# Patient Record
Sex: MALE | Race: WHITE | URBAN - METROPOLITAN AREA
[De-identification: names, ages, dates, MRNs, and addresses within clinical notes are randomized per-mention and may not be internally consistent; named-entity substitution may affect disease eponyms.]

---

## 2022-01-20 ENCOUNTER — OFFICE VISIT (OUTPATIENT)
Dept: URBAN - METROPOLITAN AREA CLINIC 76 | Facility: CLINIC | Age: 62
End: 2022-01-20
Payer: COMMERCIAL

## 2022-01-20 DIAGNOSIS — H52.13 MYOPIA, BILATERAL: Primary | ICD-10-CM

## 2022-01-20 DIAGNOSIS — H40.1332 PIGMENTARY GLAUCOMA, BILATERAL, MODERATE STAGE: ICD-10-CM

## 2022-01-20 DIAGNOSIS — H25.13 NUCLEAR SCLEROSIS CATARACT, BILATERAL: ICD-10-CM

## 2022-01-20 PROCEDURE — 92002 INTRM OPH EXAM NEW PATIENT: CPT | Performed by: OPTOMETRIST

## 2022-01-20 ASSESSMENT — INTRAOCULAR PRESSURE
OD: 24
OS: 24

## 2022-01-20 ASSESSMENT — VISUAL ACUITY
OD: 20/20
OS: 20/20

## 2022-01-20 ASSESSMENT — KERATOMETRY
OD: 25.00
OS: 44.50

## 2022-01-20 NOTE — IMPRESSION/PLAN
Impression: Diagnosis: Pigmentary glaucoma, bilateral, moderate stage. Code: I72.4041. Side: Pt states IOP runs between 20-25 OU. pt using Xalatan QHS OU but occasionally forgets drops. Pt claims he is being regularly monitored in [place]. Plan: Advised of likely vision loss with uncontrolled glaucoma, recommended continued care in [place] with regular eye care provider. Recommended yearly dilation, pt declined dilation today.